# Patient Record
Sex: MALE | Race: WHITE | NOT HISPANIC OR LATINO | Employment: FULL TIME | URBAN - METROPOLITAN AREA
[De-identification: names, ages, dates, MRNs, and addresses within clinical notes are randomized per-mention and may not be internally consistent; named-entity substitution may affect disease eponyms.]

---

## 2021-01-21 ENCOUNTER — HISTORICAL (OUTPATIENT)
Dept: RADIOLOGY | Facility: HOSPITAL | Age: 56
End: 2021-01-21

## 2021-05-27 ENCOUNTER — HISTORICAL (OUTPATIENT)
Dept: HEMATOLOGY/ONCOLOGY | Facility: CLINIC | Age: 56
End: 2021-05-27

## 2022-01-07 ENCOUNTER — HISTORICAL (OUTPATIENT)
Dept: HEMATOLOGY/ONCOLOGY | Facility: CLINIC | Age: 57
End: 2022-01-07

## 2022-07-12 DIAGNOSIS — C85.89 PRIMARY CENTRAL NERVOUS SYSTEM LYMPHOMA: Primary | ICD-10-CM

## 2022-07-15 NOTE — PROGRESS NOTES
Subjective:       Patient ID: Ghulam Cox is a 56 y.o. male.    Chief Complaint:  Intermittent muscle cramps    HPI  Diagnosis: Primary CNS lymphoma                    + COVID-19 vaccination x4    Treatment History  MATRix x 4 ---> Auto BMT 5/18    Current Treatment: Observation    Clinical History:  Patient initially presented 12/17 with balance issues and fell on several occasions. He subsequently developed right sided weakness which progressed fairly rapidly. He was diagnosed with primary CNS lymphoma by biopsy 1/18. 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 No MYC rearrangement. He was treated at [facility] ([facility]) with 4 cycles of MATRix (HD Mtx, HD Mandy-C, Rituxan, Thiotepa) with a favorable response and improvement in his neurologic deficits. He underwent an autologous stem cell transplant with BCNU-Thiotepa conditioning and [place] due to insurance issues 5/18 with a complete response. He was followed on surveillance at [facility] with no evidence of disease recurrence. MRI of the brain 1/23/20 showed periventricular treatment-related encephalomalacia in the left parietal lobe with no evidence of residual or recurrent disease. His last surveillance appointment at [facility] was 7/29/20. He had no clinical findings suspicious for disease recurrence. A follow-up MRI of the brain was recommended in 6 months.    He was seen as a new patient at [facility] 1/12/21 to establish local Oncology care. He relocated here in November, 2020 due to his job. He denied any headaches, vision changes or focal neurologic symptoms. He reported chronic right hip pain which was long-standing. He underwent an MRI of the brain 1/21/21 with comparison to a previous outside MRI 1/22/20 from [facility]. There was mild T2/FLAIR hyperintense signal in the left frontal and periventricular white matter which was stable and unchanged from his previous exam.  There were no additional abnormal findings or evidence of disease recurrence.      Interval History  He returns to the office today by himself for a 6 month surveillance visit.  He is now 4 years out from his consolidative stem cell transplant.  He has chronic paresthesias on the right side, leg more than the arm.  He is taking gabapentin 600 mg at bedtime and 300 mg occasionally during the day for symptom control.  He also has intermittent muscle cramping.  He is unable to take multivitamins because it worsens his symptoms.  Surveillance MRI of the brain 1/7/22 showed stable post treatment changes in the left frontal lobe with hemosiderin staining.  There was no abnormal enhancement, edema or mass effect.  Laboratory testing shows a stable erythrocytosis due to testosterone replacement.    Review of Systems   Constitutional: Negative for appetite change, fatigue and fever.   HENT: Negative for mouth sores, sore throat and trouble swallowing.    Eyes: Negative.    Respiratory: Negative for cough, chest tightness and shortness of breath.    Cardiovascular: Negative for chest pain, palpitations and leg swelling.   Gastrointestinal: Negative for abdominal distention, abdominal pain, blood in stool, change in bowel habit, constipation, diarrhea, nausea, vomiting and change in bowel habit.   Genitourinary: Negative for dysuria, frequency and urgency.   Musculoskeletal: Negative for arthralgias and back pain.        Intermittent muscle cramping   Integumentary:  Negative for rash and mole/lesion.   Neurological: Positive for numbness (Intermittent numbness/tingling right leg).   Hematological: Negative for adenopathy. Does not bruise/bleed easily.       PMHx:  Sciatica, gout, kidney stones, secondary polycythemia (testosterone), RLE DVT  PSHx:  Brain biopsy, right fourth finger surgery (trauma), left hip replacement  SH:  Former smoker <1 PPD, quit 1991. Rare alcohol use in the past, none currently. , lives in  [place]  [employer] .  FH:  No family history of cancer.       Objective:        /83 (BP Location: Right arm, Patient Position: Sitting, BP Method: Large (Automatic))   Pulse 84   Temp 96 °F (35.6 °C)   Resp 18   Ht 5' 7" (1.702 m)   Wt 105.2 kg (231 lb 14.8 oz)   SpO2 99%   BMI 36.32 kg/m²    Physical Exam  Constitutional:       Appearance: Normal appearance.   HENT:      Head: Normocephalic.      Comments: Well-healed left parietal biopsy site     Nose: Nose normal.      Mouth/Throat:      Mouth: Mucous membranes are moist.      Pharynx: Oropharynx is clear. No posterior oropharyngeal erythema.   Eyes:      Extraocular Movements: Extraocular movements intact.      Conjunctiva/sclera: Conjunctivae normal.      Pupils: Pupils are equal, round, and reactive to light.   Cardiovascular:      Rate and Rhythm: Normal rate and regular rhythm.      Heart sounds: No murmur heard.  Pulmonary:      Breath sounds: Normal breath sounds.   Abdominal:      General: Abdomen is flat. Bowel sounds are normal. There is no distension.      Palpations: Abdomen is soft. There is no mass.      Tenderness: There is no abdominal tenderness.   Musculoskeletal:         General: No swelling or tenderness. Normal range of motion.      Cervical back: Normal range of motion and neck supple. No tenderness.   Lymphadenopathy:      Comments: No palpable cervical, supraclavicular, axillary or inguinal adenopathy.   Skin:     General: Skin is warm and dry.      Findings: No lesion or rash.   Neurological:      General: No focal deficit present.      Mental Status: He is alert and oriented to person, place, and time.      Cranial Nerves: No cranial nerve deficit.      Gait: Gait normal.       ECOG SCORE    0 - Fully active-able to carry on all pre-disease performance without restriction          LABORATORY  Recent Results (from the past 168 hour(s))   CBC with Differential    Collection Time: " 07/19/22  2:44 PM   Result Value Ref Range    WBC 5.8 4.5 - 11.5 x10(3)/mcL    RBC 5.80 4.70 - 6.10 x10(6)/mcL    Hgb 18.2 (H) 14.0 - 18.0 gm/dL    Hct 54.0 (H) 42.0 - 52.0 %    MCV 93.1 80.0 - 94.0 fL    MCH 31.4 (H) 27.0 - 31.0 pg    MCHC 33.7 33.0 - 36.0 mg/dL    RDW 12.9 11.5 - 17.0 %    Platelet 163 130 - 400 x10(3)/mcL    MPV 9.7 7.4 - 10.4 fL    Neut % 71.9 %    Lymph % 18.1 %    Mono % 8.8 %    Eos % 0.5 %    Basophil % 0.5 %    Lymph # 1.05 0.6 - 4.6 x10(3)/mcL    Neut # 4.2 2.1 - 9.2 x10(3)/mcL    Mono # 0.51 0.1 - 1.3 x10(3)/mcL    Eos # 0.03 0 - 0.9 x10(3)/mcL    Baso # 0.03 0 - 0.2 x10(3)/mcL    IG# 0.01 0 - 0.04 x10(3)/mcL    IG% 0.2 %            Assessment:   Primary CNS lymphoma - in clinical remission  Chronic neuropathic symptoms involving right leg - stable  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  He has chronic paresthesias and intermittent muscle cramps from his previous treatment.  He has a stable erythrocytosis from his testosterone replacement therapy.  He is being referred for a cardiology evaluation by his primary care doctor which would be prudent given his secondary erythrocytosis.  He will be scheduled for a follow-up MRI of the brain in 6 months.  RTC after the MRI for a follow-up visit and clinical exam.      BRENNEN ZHOU MD    Other Physicians  Dr. Brandon Mclain ([facility])

## 2022-07-19 ENCOUNTER — LAB VISIT (OUTPATIENT)
Dept: LAB | Facility: HOSPITAL | Age: 57
End: 2022-07-19
Attending: INTERNAL MEDICINE
Payer: COMMERCIAL

## 2022-07-19 ENCOUNTER — OFFICE VISIT (OUTPATIENT)
Dept: HEMATOLOGY/ONCOLOGY | Facility: CLINIC | Age: 57
End: 2022-07-19
Payer: COMMERCIAL

## 2022-07-19 VITALS
WEIGHT: 231.94 LBS | TEMPERATURE: 96 F | SYSTOLIC BLOOD PRESSURE: 132 MMHG | OXYGEN SATURATION: 99 % | BODY MASS INDEX: 36.4 KG/M2 | DIASTOLIC BLOOD PRESSURE: 83 MMHG | HEIGHT: 67 IN | RESPIRATION RATE: 18 BRPM | HEART RATE: 84 BPM

## 2022-07-19 DIAGNOSIS — C80.1 NEUROPATHY ASSOCIATED WITH CANCER: ICD-10-CM

## 2022-07-19 DIAGNOSIS — G63 NEUROPATHY ASSOCIATED WITH CANCER: ICD-10-CM

## 2022-07-19 DIAGNOSIS — C85.89 PRIMARY CENTRAL NERVOUS SYSTEM LYMPHOMA: ICD-10-CM

## 2022-07-19 DIAGNOSIS — C85.89 PRIMARY CENTRAL NERVOUS SYSTEM LYMPHOMA: Primary | ICD-10-CM

## 2022-07-19 LAB
BASOPHILS # BLD AUTO: 0.03 X10(3)/MCL (ref 0–0.2)
BASOPHILS NFR BLD AUTO: 0.5 %
EOSINOPHIL # BLD AUTO: 0.03 X10(3)/MCL (ref 0–0.9)
EOSINOPHIL NFR BLD AUTO: 0.5 %
ERYTHROCYTE [DISTWIDTH] IN BLOOD BY AUTOMATED COUNT: 12.9 % (ref 11.5–17)
HCT VFR BLD AUTO: 54 % (ref 42–52)
HGB BLD-MCNC: 18.2 GM/DL (ref 14–18)
IMM GRANULOCYTES # BLD AUTO: 0.01 X10(3)/MCL (ref 0–0.04)
IMM GRANULOCYTES NFR BLD AUTO: 0.2 %
LYMPHOCYTES # BLD AUTO: 1.05 X10(3)/MCL (ref 0.6–4.6)
LYMPHOCYTES NFR BLD AUTO: 18.1 %
MCH RBC QN AUTO: 31.4 PG (ref 27–31)
MCHC RBC AUTO-ENTMCNC: 33.7 MG/DL (ref 33–36)
MCV RBC AUTO: 93.1 FL (ref 80–94)
MONOCYTES # BLD AUTO: 0.51 X10(3)/MCL (ref 0.1–1.3)
MONOCYTES NFR BLD AUTO: 8.8 %
NEUTROPHILS # BLD AUTO: 4.2 X10(3)/MCL (ref 2.1–9.2)
NEUTROPHILS NFR BLD AUTO: 71.9 %
PLATELET # BLD AUTO: 163 X10(3)/MCL (ref 130–400)
PMV BLD AUTO: 9.7 FL (ref 7.4–10.4)
RBC # BLD AUTO: 5.8 X10(6)/MCL (ref 4.7–6.1)
WBC # SPEC AUTO: 5.8 X10(3)/MCL (ref 4.5–11.5)

## 2022-07-19 PROCEDURE — 3008F PR BODY MASS INDEX (BMI) DOCUMENTED: ICD-10-PCS | Mod: CPTII,S$GLB,, | Performed by: INTERNAL MEDICINE

## 2022-07-19 PROCEDURE — 3075F PR MOST RECENT SYSTOLIC BLOOD PRESS GE 130-139MM HG: ICD-10-PCS | Mod: CPTII,S$GLB,, | Performed by: INTERNAL MEDICINE

## 2022-07-19 PROCEDURE — 1160F PR REVIEW ALL MEDS BY PRESCRIBER/CLIN PHARMACIST DOCUMENTED: ICD-10-PCS | Mod: CPTII,S$GLB,, | Performed by: INTERNAL MEDICINE

## 2022-07-19 PROCEDURE — 99214 PR OFFICE/OUTPT VISIT, EST, LEVL IV, 30-39 MIN: ICD-10-PCS | Mod: S$GLB,,, | Performed by: INTERNAL MEDICINE

## 2022-07-19 PROCEDURE — 3075F SYST BP GE 130 - 139MM HG: CPT | Mod: CPTII,S$GLB,, | Performed by: INTERNAL MEDICINE

## 2022-07-19 PROCEDURE — 3079F DIAST BP 80-89 MM HG: CPT | Mod: CPTII,S$GLB,, | Performed by: INTERNAL MEDICINE

## 2022-07-19 PROCEDURE — 3008F BODY MASS INDEX DOCD: CPT | Mod: CPTII,S$GLB,, | Performed by: INTERNAL MEDICINE

## 2022-07-19 PROCEDURE — 36415 COLL VENOUS BLD VENIPUNCTURE: CPT

## 2022-07-19 PROCEDURE — 1159F MED LIST DOCD IN RCRD: CPT | Mod: CPTII,S$GLB,, | Performed by: INTERNAL MEDICINE

## 2022-07-19 PROCEDURE — 1159F PR MEDICATION LIST DOCUMENTED IN MEDICAL RECORD: ICD-10-PCS | Mod: CPTII,S$GLB,, | Performed by: INTERNAL MEDICINE

## 2022-07-19 PROCEDURE — 85025 COMPLETE CBC W/AUTO DIFF WBC: CPT

## 2022-07-19 PROCEDURE — 1160F RVW MEDS BY RX/DR IN RCRD: CPT | Mod: CPTII,S$GLB,, | Performed by: INTERNAL MEDICINE

## 2022-07-19 PROCEDURE — 99214 OFFICE O/P EST MOD 30 MIN: CPT | Mod: S$GLB,,, | Performed by: INTERNAL MEDICINE

## 2022-07-19 PROCEDURE — 99999 PR PBB SHADOW E&M-EST. PATIENT-LVL IV: ICD-10-PCS | Mod: PBBFAC,,, | Performed by: INTERNAL MEDICINE

## 2022-07-19 PROCEDURE — 99999 PR PBB SHADOW E&M-EST. PATIENT-LVL IV: CPT | Mod: PBBFAC,,, | Performed by: INTERNAL MEDICINE

## 2022-07-19 PROCEDURE — 3079F PR MOST RECENT DIASTOLIC BLOOD PRESSURE 80-89 MM HG: ICD-10-PCS | Mod: CPTII,S$GLB,, | Performed by: INTERNAL MEDICINE

## 2022-07-19 RX ORDER — SILDENAFIL 100 MG/1
TABLET, FILM COATED ORAL DAILY PRN
COMMUNITY
Start: 2022-04-06

## 2022-07-19 RX ORDER — CYCLOBENZAPRINE HCL 5 MG
5 TABLET ORAL 2 TIMES DAILY
COMMUNITY
Start: 2022-06-30

## 2022-07-19 RX ORDER — ALLOPURINOL 100 MG/1
100 TABLET ORAL DAILY
COMMUNITY
End: 2024-01-19

## 2022-07-19 RX ORDER — NEOMYCIN SULFATE, POLYMYXIN B SULFATE, HYDROCORTISONE 3.5; 10000; 1 MG/ML; [USP'U]/ML; MG/ML
2 SOLUTION/ DROPS AURICULAR (OTIC)
COMMUNITY
Start: 2021-06-01 | End: 2022-07-19

## 2022-07-19 RX ORDER — GABAPENTIN 300 MG/1
600 CAPSULE ORAL NIGHTLY
Qty: 90 CAPSULE | Refills: 6 | Status: SHIPPED | OUTPATIENT
Start: 2022-07-19 | End: 2023-01-18 | Stop reason: SDUPTHER

## 2022-07-19 RX ORDER — TRAZODONE HYDROCHLORIDE 50 MG/1
100 TABLET ORAL NIGHTLY PRN
COMMUNITY
Start: 2022-05-26

## 2022-07-19 RX ORDER — GABAPENTIN 300 MG/1
600 CAPSULE ORAL NIGHTLY
COMMUNITY
Start: 2022-07-09 | End: 2022-07-19 | Stop reason: SDUPTHER

## 2022-12-14 ENCOUNTER — APPOINTMENT (OUTPATIENT)
Dept: RADIOLOGY | Facility: HOSPITAL | Age: 57
End: 2022-12-14
Attending: INTERNAL MEDICINE
Payer: COMMERCIAL

## 2022-12-14 DIAGNOSIS — C85.89 PRIMARY CENTRAL NERVOUS SYSTEM LYMPHOMA: ICD-10-CM

## 2022-12-14 DIAGNOSIS — C80.1 NEUROPATHY ASSOCIATED WITH CANCER: ICD-10-CM

## 2022-12-14 DIAGNOSIS — G63 NEUROPATHY ASSOCIATED WITH CANCER: ICD-10-CM

## 2022-12-14 PROCEDURE — 70553 MRI BRAIN STEM W/O & W/DYE: CPT | Mod: TC

## 2022-12-14 PROCEDURE — 25500020 PHARM REV CODE 255: Performed by: INTERNAL MEDICINE

## 2022-12-14 PROCEDURE — A9577 INJ MULTIHANCE: HCPCS | Performed by: INTERNAL MEDICINE

## 2022-12-14 RX ADMIN — GADOBENATE DIMEGLUMINE 20 ML: 529 INJECTION, SOLUTION INTRAVENOUS at 11:12

## 2022-12-23 NOTE — PROGRESS NOTES
Subjective:       Patient ID: Ghulam Cox is a 57 y.o. male.    Chief Complaint:  I feel about the same    Diagnosis: Primary CNS lymphoma                    + COVID-19 vaccinated    Treatment History  MATRix x 4 ---> Auto BMT 5/18    Current Treatment: Observation    Clinical History:  Patient initially presented 12/17 with balance issues and fell on several occasions. He subsequently developed right sided weakness which progressed fairly rapidly. He was diagnosed with primary CNS lymphoma by biopsy 1/18. 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 No MYC rearrangement. He was treated at [facility] ([facility]) with 4 cycles of MATRix (HD Mtx, HD Mandy-C, Rituxan, Thiotepa) with a favorable response and improvement in his neurologic deficits. He underwent an autologous stem cell transplant with BCNU-Thiotepa conditioning and [place] due to insurance issues 5/18 with a complete response. He was followed on surveillance at [facility] with no evidence of disease recurrence. MRI of the brain 1/23/20 showed periventricular treatment-related encephalomalacia in the left parietal lobe with no evidence of residual or recurrent disease. His last surveillance appointment at [facility] was 7/29/20. He had no clinical findings suspicious for disease recurrence. A follow-up MRI of the brain was recommended in 6 months.    He was seen as a new patient at [facility] 1/12/21 to establish local Oncology care. He relocated here in November, 2020 due to his job. He denied any headaches, vision changes or focal neurologic symptoms. He reported chronic right hip pain which was long-standing. He underwent an MRI of the brain 1/21/21 with comparison to a previous outside MRI 1/22/20 from [facility]. There was mild T2/FLAIR hyperintense signal in the left frontal and periventricular white matter which was stable and unchanged from his previous exam. There were no  additional abnormal findings or evidence of disease recurrence.  MRI of the brain 1/7/22 showed stable post treatment changes in the left frontal lobe with hemosiderin staining.     Interval History  He returns to clinic today by himself for a six-month follow-up visit.  He is now 4 and half years out from his consolidative stem cell transplant.  He has stable chronic paresthesias on the right, leg greater than the arm.  He is ambulatory without assistance.  He denies any falls.  He is still taking gabapentin which helps manage his symptoms.  He is also using medical marijuana which has helped with his muscle cramps and twitching.  He denies any headaches.  He complains of a poor short-term memory.  MRI of the brain 12/14/22 showed stable post treatment changes in the left frontal lobe with no findings suspicious for disease recurrence.  Laboratory testing shows mild erythrocytosis secondary to testosterone replacement.      Review of Systems   Constitutional:  Negative for appetite change, fatigue, fever and unexpected weight change.   HENT:  Negative for mouth sores, sore throat and trouble swallowing.    Eyes: Negative.    Respiratory:  Negative for cough and shortness of breath.    Cardiovascular:  Negative for chest pain, palpitations and leg swelling.   Gastrointestinal:  Negative for abdominal distention, abdominal pain, blood in stool, constipation, diarrhea and nausea.   Genitourinary:  Negative for dysuria, frequency and urgency.   Musculoskeletal:  Negative for arthralgias and back pain.        Intermittent muscle cramping - improved   Integumentary:  Negative for rash and mole/lesion.   Neurological:  Positive for numbness (Intermittent numbness/tingling right leg). Negative for dizziness, weakness and headaches.   Hematological:  Negative for adenopathy. Does not bruise/bleed easily.   Psychiatric/Behavioral: Negative.       PMHx:  Sciatica, gout, kidney stones, secondary polycythemia (testosterone),  "RLE DVT  PSHx:  Brain biopsy, right fourth finger surgery (trauma), left hip replacement  SH:  Former smoker <1 PPD, quit 1991. Rare alcohol use in the past, none currently. , lives in [place].  of [employer] .  FH:  No family history of cancer.       Objective:        /78 (BP Location: Right arm)   Pulse 73   Temp 99.7 °F (37.6 °C) (Oral)   Resp 18   Ht 5' 7" (1.702 m)   Wt 100.7 kg (222 lb 0.1 oz)   BMI 34.77 kg/m²    Physical Exam  Constitutional:       Comments: Well-developed white male in NAD   HENT:      Head: Normocephalic.      Comments: Well-healed left parietal biopsy site     Mouth/Throat:      Mouth: Mucous membranes are moist.      Pharynx: Oropharynx is clear. No posterior oropharyngeal erythema.   Eyes:      Extraocular Movements: Extraocular movements intact.      Conjunctiva/sclera: Conjunctivae normal.      Pupils: Pupils are equal, round, and reactive to light.   Cardiovascular:      Rate and Rhythm: Normal rate and regular rhythm.      Heart sounds: No murmur heard.  Pulmonary:      Comments: Lungs clear to auscultation  Abdominal:      General: Abdomen is flat. Bowel sounds are normal. There is no distension.      Palpations: Abdomen is soft. There is no mass.      Tenderness: There is no abdominal tenderness.   Musculoskeletal:         General: No swelling or tenderness. Normal range of motion.      Cervical back: Normal range of motion and neck supple. No tenderness.   Lymphadenopathy:      Comments: No palpable cervical, supraclavicular, axillary or inguinal adenopathy.   Skin:     General: Skin is warm and dry.      Findings: No rash.   Neurological:      General: No focal deficit present.      Mental Status: He is alert and oriented to person, place, and time.      Cranial Nerves: No cranial nerve deficit.      Gait: Gait normal.     ECOG SCORE    1 - Restricted in strenuous activity-ambulatory and able to carry out work of a light " nature          LABORATORY  Recent Results (from the past 168 hour(s))   CBC with Differential    Collection Time: 12/27/22  2:32 PM   Result Value Ref Range    WBC 5.6 4.5 - 11.5 x10(3)/mcL    RBC 5.24 4.70 - 6.10 x10(6)/mcL    Hgb 16.5 14.0 - 18.0 gm/dL    Hct 49.5 42.0 - 52.0 %    MCV 94.5 (H) 80.0 - 94.0 fL    MCH 31.5 pg    MCHC 33.3 33.0 - 36.0 mg/dL    RDW 12.8 11.6 - 14.4 %    Platelet 164 140 - 371 x10(3)/mcL    MPV 9.4 9.4 - 12.4 fL    Neut % 69.9 %    Lymph % 17.6 %    Mono % 11.3 %    Eos % 0.5 %    Basophil % 0.5 %    Lymph # 0.99 0.6 - 4.6 x10(3)/mcL    Neut # 3.94 2.1 - 9.2 x10(3)/mcL    Mono # 0.64 0.1 - 1.3 x10(3)/mcL    Eos # 0.03 0 - 0.9 x10(3)/mcL    Baso # 0.03 0 - 0.2 x10(3)/mcL    IG# 0.01 0 - 0.04 x10(3)/mcL    IG% 0.2 %              Assessment:   Primary CNS lymphoma - in clinical remission  Chronic neuropathic symptoms involving right leg - stable  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  Laboratory shows a stable erythrocytosis secondary to testosterone replacement therapy.  He will return in 6 months for a follow-up visit and clinical exam with repeat laboratory.  No indications for additional surveillance imaging unless he develops new symptoms or problems.  If he continues to do well on his next visit, will change surveillance follow-up to yearly.      BRENNEN ZHOU MD    Other Physicians  Dr. Brandon Mclain ([facility])

## 2022-12-27 ENCOUNTER — OFFICE VISIT (OUTPATIENT)
Dept: HEMATOLOGY/ONCOLOGY | Facility: CLINIC | Age: 57
End: 2022-12-27
Payer: COMMERCIAL

## 2022-12-27 VITALS
DIASTOLIC BLOOD PRESSURE: 78 MMHG | SYSTOLIC BLOOD PRESSURE: 126 MMHG | HEART RATE: 73 BPM | RESPIRATION RATE: 18 BRPM | WEIGHT: 222 LBS | BODY MASS INDEX: 34.84 KG/M2 | TEMPERATURE: 100 F | HEIGHT: 67 IN

## 2022-12-27 DIAGNOSIS — C85.89 PRIMARY CENTRAL NERVOUS SYSTEM LYMPHOMA: Primary | ICD-10-CM

## 2022-12-27 PROCEDURE — 3078F PR MOST RECENT DIASTOLIC BLOOD PRESSURE < 80 MM HG: ICD-10-PCS | Mod: CPTII,S$GLB,, | Performed by: INTERNAL MEDICINE

## 2022-12-27 PROCEDURE — 3078F DIAST BP <80 MM HG: CPT | Mod: CPTII,S$GLB,, | Performed by: INTERNAL MEDICINE

## 2022-12-27 PROCEDURE — 1160F PR REVIEW ALL MEDS BY PRESCRIBER/CLIN PHARMACIST DOCUMENTED: ICD-10-PCS | Mod: CPTII,S$GLB,, | Performed by: INTERNAL MEDICINE

## 2022-12-27 PROCEDURE — 1159F MED LIST DOCD IN RCRD: CPT | Mod: CPTII,S$GLB,, | Performed by: INTERNAL MEDICINE

## 2022-12-27 PROCEDURE — 99999 PR PBB SHADOW E&M-EST. PATIENT-LVL III: ICD-10-PCS | Mod: PBBFAC,,, | Performed by: INTERNAL MEDICINE

## 2022-12-27 PROCEDURE — 99214 PR OFFICE/OUTPT VISIT, EST, LEVL IV, 30-39 MIN: ICD-10-PCS | Mod: S$GLB,,, | Performed by: INTERNAL MEDICINE

## 2022-12-27 PROCEDURE — 3008F PR BODY MASS INDEX (BMI) DOCUMENTED: ICD-10-PCS | Mod: CPTII,S$GLB,, | Performed by: INTERNAL MEDICINE

## 2022-12-27 PROCEDURE — 1160F RVW MEDS BY RX/DR IN RCRD: CPT | Mod: CPTII,S$GLB,, | Performed by: INTERNAL MEDICINE

## 2022-12-27 PROCEDURE — 1159F PR MEDICATION LIST DOCUMENTED IN MEDICAL RECORD: ICD-10-PCS | Mod: CPTII,S$GLB,, | Performed by: INTERNAL MEDICINE

## 2022-12-27 PROCEDURE — 3074F PR MOST RECENT SYSTOLIC BLOOD PRESSURE < 130 MM HG: ICD-10-PCS | Mod: CPTII,S$GLB,, | Performed by: INTERNAL MEDICINE

## 2022-12-27 PROCEDURE — 99214 OFFICE O/P EST MOD 30 MIN: CPT | Mod: S$GLB,,, | Performed by: INTERNAL MEDICINE

## 2022-12-27 PROCEDURE — 3074F SYST BP LT 130 MM HG: CPT | Mod: CPTII,S$GLB,, | Performed by: INTERNAL MEDICINE

## 2022-12-27 PROCEDURE — 99999 PR PBB SHADOW E&M-EST. PATIENT-LVL III: CPT | Mod: PBBFAC,,, | Performed by: INTERNAL MEDICINE

## 2022-12-27 PROCEDURE — 3008F BODY MASS INDEX DOCD: CPT | Mod: CPTII,S$GLB,, | Performed by: INTERNAL MEDICINE

## 2022-12-27 RX ORDER — MELOXICAM 7.5 MG/1
7.5 TABLET ORAL DAILY
COMMUNITY

## 2023-01-18 DIAGNOSIS — C85.89 PRIMARY CENTRAL NERVOUS SYSTEM LYMPHOMA: Primary | ICD-10-CM

## 2023-01-18 DIAGNOSIS — G63 NEUROPATHY ASSOCIATED WITH CANCER: ICD-10-CM

## 2023-01-18 DIAGNOSIS — C80.1 NEUROPATHY ASSOCIATED WITH CANCER: ICD-10-CM

## 2023-01-18 RX ORDER — GABAPENTIN 300 MG/1
600 CAPSULE ORAL NIGHTLY
Qty: 90 CAPSULE | Refills: 6 | Status: SHIPPED | OUTPATIENT
Start: 2023-01-18 | End: 2023-12-04 | Stop reason: SDUPTHER

## 2023-01-18 RX ORDER — NEOMYCIN SULFATE, POLYMYXIN B SULFATE AND HYDROCORTISONE 10; 3.5; 1 MG/ML; MG/ML; [USP'U]/ML
SUSPENSION/ DROPS AURICULAR (OTIC)
Qty: 10 ML | Refills: 0 | Status: SHIPPED | OUTPATIENT
Start: 2023-01-18

## 2023-07-10 ENCOUNTER — TELEPHONE (OUTPATIENT)
Dept: HEMATOLOGY/ONCOLOGY | Facility: CLINIC | Age: 58
End: 2023-07-10
Payer: COMMERCIAL

## 2023-07-20 NOTE — PROGRESS NOTES
Subjective:       Patient ID: Ghulam Cox is a 57 y.o. male.    Chief Complaint:  No new symptoms    Diagnosis: Primary CNS lymphoma                    + COVID-19 vaccinated    Treatment History  MATRix x 4 ---> Auto BMT 5/18    Current Treatment: Observation    Clinical History:  Patient initially presented 12/17 with balance issues and fell on several occasions. He subsequently developed right sided weakness which progressed fairly rapidly. He was diagnosed with primary CNS lymphoma by biopsy 1/18. 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 No MYC rearrangement. He was treated at [facility] ([facility]) with 4 cycles of MATRix (HD Mtx, HD Mandy-C, Rituxan, Thiotepa) with a favorable response and improvement in his neurologic deficits. He underwent an autologous stem cell transplant with BCNU-Thiotepa conditioning and [place] due to insurance issues 5/18 with a complete response. He was followed on surveillance at [facility] with no evidence of disease recurrence. MRI of the brain 1/23/20 showed periventricular treatment-related encephalomalacia in the left parietal lobe with no evidence of residual or recurrent disease. His last surveillance appointment at [facility] was 7/29/20. He had no clinical findings suspicious for disease recurrence. A follow-up MRI of the brain was recommended in 6 months.    He was seen as a new patient at [facility] 1/12/21 to establish local Oncology care. He relocated here in November, 2020 due to his job. He denied any headaches, vision changes or focal neurologic symptoms. He reported chronic right hip pain which was long-standing. He underwent an MRI of the brain 1/21/21 with comparison to a previous outside MRI 1/22/20 from [facility]. There was mild T2/FLAIR hyperintense signal in the left frontal and periventricular white matter which was stable and unchanged from his previous exam. There were no  additional abnormal findings or evidence of disease recurrence.  MRI of the brain 1/7/22 showed stable post treatment changes in the left frontal lobe with hemosiderin staining.     Interval History  He returns to the office today by himself for a six-month surveillance visit.  He is now over 5 years out from his consolidative stem cell transplant.  He has stable chronic paresthesias involving the right leg and to a lesser extent, the right arm.  He developed intermittent muscle cramps and twitching.  He uses medical marijuana which helps his symptoms.  He has poor short-term memory which is stable.  MRI of the brain 12/14/22 showed stable post treatment changes in the left frontal lobe with no findings suspicious for disease recurrence.  Laboratory testing shows stable mild erythrocytosis secondary to testosterone replacement.  He has not had any recent illnesses.      Review of Systems   Constitutional:  Negative for appetite change, fatigue, fever and unexpected weight change.   HENT:  Negative for mouth sores, sore throat and trouble swallowing.    Eyes: Negative.    Respiratory:  Negative for cough and shortness of breath.    Cardiovascular:  Negative for chest pain, palpitations and leg swelling.   Gastrointestinal:  Negative for abdominal distention, abdominal pain, blood in stool, constipation, diarrhea and nausea.   Genitourinary:  Negative for dysuria, frequency and urgency.   Musculoskeletal:  Negative for arthralgias and back pain.        Intermittent muscle cramping - improved   Integumentary:  Negative for rash and mole/lesion.   Neurological:  Positive for numbness (Intermittent numbness/tingling right leg). Negative for dizziness, weakness and headaches.   Hematological:  Negative for adenopathy. Does not bruise/bleed easily.   Psychiatric/Behavioral: Negative.         PMHx:  Sciatica, gout, kidney stones, secondary polycythemia (testosterone), RLE DVT  PSHx:  Brain biopsy, right fourth finger  "surgery (trauma), left hip replacement  SH:  Former smoker <1 PPD, quit 1991. Rare alcohol use in the past, none currently. , lives in [place].  of [employer] .  FH:  No family history of cancer.     Objective:        /75   Pulse 81   Temp 98 °F (36.7 °C)   Resp 16   Ht 5' 7" (1.702 m)   Wt 102.8 kg (226 lb 9.6 oz)   SpO2 98%   BMI 35.49 kg/m²    Physical Exam  Constitutional:       Comments: Well-developed white male in NAD   HENT:      Head: Normocephalic.      Comments: Well-healed left parietal biopsy site     Mouth/Throat:      Mouth: Mucous membranes are moist.      Pharynx: Oropharynx is clear. No posterior oropharyngeal erythema.   Eyes:      Extraocular Movements: Extraocular movements intact.      Conjunctiva/sclera: Conjunctivae normal.      Pupils: Pupils are equal, round, and reactive to light.   Cardiovascular:      Rate and Rhythm: Normal rate and regular rhythm.      Heart sounds: No murmur heard.  Pulmonary:      Comments: Lungs clear to auscultation  Abdominal:      General: Abdomen is flat. Bowel sounds are normal. There is no distension.      Palpations: Abdomen is soft. There is no mass.      Tenderness: There is no abdominal tenderness.   Musculoskeletal:         General: No swelling or tenderness. Normal range of motion.      Cervical back: Normal range of motion and neck supple. No tenderness.   Lymphadenopathy:      Comments: No palpable cervical, supraclavicular, axillary or inguinal adenopathy.   Skin:     General: Skin is warm and dry.      Findings: No rash.   Neurological:      General: No focal deficit present.      Mental Status: He is alert and oriented to person, place, and time.      Cranial Nerves: No cranial nerve deficit.      Gait: Gait normal.     ECOG SCORE    1 - Restricted in strenuous activity-ambulatory and able to carry out work of a light nature          LABORATORY  Recent Results (from the past 168 hour(s))   CBC " with Differential    Collection Time: 07/24/23  9:50 AM   Result Value Ref Range    WBC 6.57 4.50 - 11.50 x10(3)/mcL    RBC 5.18 4.70 - 6.10 x10(6)/mcL    Hgb 16.4 14.0 - 18.0 g/dL    Hct 49.9 42.0 - 52.0 %    MCV 96.3 (H) 80.0 - 94.0 fL    MCH 31.7 (H) 27.0 - 31.0 pg    MCHC 32.9 (L) 33.0 - 36.0 g/dL    RDW 12.9 11.5 - 17.0 %    Platelet 178 130 - 400 x10(3)/mcL    MPV 9.5 7.4 - 10.4 fL    Neut % 61.9 %    Lymph % 26.2 %    Mono % 9.6 %    Eos % 1.5 %    Basophil % 0.5 %    Lymph # 1.72 0.6 - 4.6 x10(3)/mcL    Neut # 4.07 2.1 - 9.2 x10(3)/mcL    Mono # 0.63 0.1 - 1.3 x10(3)/mcL    Eos # 0.10 0 - 0.9 x10(3)/mcL    Baso # 0.03 <=0.2 x10(3)/mcL    IG# 0.02 0 - 0.04 x10(3)/mcL    IG% 0.3 %         Assessment:   Primary CNS lymphoma - in clinical remission  Chronic neuropathic symptoms involving right leg - stable  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  He will be scheduled for an MRI of the brain in December.  If MRI remains stable at that time, will defer additional imaging unless he develops new symptoms or problems and change surveillance visits to once a year.  Treatment plan reviewed and discussed with the patient.  All questions answered.      BRENNEN ZHOU MD    Other Physicians  Dr. Brandon Mclain ([facility])

## 2023-07-24 ENCOUNTER — OFFICE VISIT (OUTPATIENT)
Dept: HEMATOLOGY/ONCOLOGY | Facility: CLINIC | Age: 58
End: 2023-07-24
Payer: COMMERCIAL

## 2023-07-24 VITALS
HEIGHT: 67 IN | TEMPERATURE: 98 F | HEART RATE: 81 BPM | SYSTOLIC BLOOD PRESSURE: 115 MMHG | OXYGEN SATURATION: 98 % | WEIGHT: 226.63 LBS | BODY MASS INDEX: 35.57 KG/M2 | DIASTOLIC BLOOD PRESSURE: 75 MMHG | RESPIRATION RATE: 16 BRPM

## 2023-07-24 DIAGNOSIS — C85.89 PRIMARY CENTRAL NERVOUS SYSTEM LYMPHOMA: Primary | ICD-10-CM

## 2023-07-24 PROCEDURE — 3078F DIAST BP <80 MM HG: CPT | Mod: CPTII,S$GLB,, | Performed by: INTERNAL MEDICINE

## 2023-07-24 PROCEDURE — 3008F PR BODY MASS INDEX (BMI) DOCUMENTED: ICD-10-PCS | Mod: CPTII,S$GLB,, | Performed by: INTERNAL MEDICINE

## 2023-07-24 PROCEDURE — 1160F RVW MEDS BY RX/DR IN RCRD: CPT | Mod: CPTII,S$GLB,, | Performed by: INTERNAL MEDICINE

## 2023-07-24 PROCEDURE — 99214 PR OFFICE/OUTPT VISIT, EST, LEVL IV, 30-39 MIN: ICD-10-PCS | Mod: S$GLB,,, | Performed by: INTERNAL MEDICINE

## 2023-07-24 PROCEDURE — 3074F SYST BP LT 130 MM HG: CPT | Mod: CPTII,S$GLB,, | Performed by: INTERNAL MEDICINE

## 2023-07-24 PROCEDURE — 3074F PR MOST RECENT SYSTOLIC BLOOD PRESSURE < 130 MM HG: ICD-10-PCS | Mod: CPTII,S$GLB,, | Performed by: INTERNAL MEDICINE

## 2023-07-24 PROCEDURE — 1159F MED LIST DOCD IN RCRD: CPT | Mod: CPTII,S$GLB,, | Performed by: INTERNAL MEDICINE

## 2023-07-24 PROCEDURE — 99214 OFFICE O/P EST MOD 30 MIN: CPT | Mod: S$GLB,,, | Performed by: INTERNAL MEDICINE

## 2023-07-24 PROCEDURE — 3078F PR MOST RECENT DIASTOLIC BLOOD PRESSURE < 80 MM HG: ICD-10-PCS | Mod: CPTII,S$GLB,, | Performed by: INTERNAL MEDICINE

## 2023-07-24 PROCEDURE — 1160F PR REVIEW ALL MEDS BY PRESCRIBER/CLIN PHARMACIST DOCUMENTED: ICD-10-PCS | Mod: CPTII,S$GLB,, | Performed by: INTERNAL MEDICINE

## 2023-07-24 PROCEDURE — 99999 PR PBB SHADOW E&M-EST. PATIENT-LVL IV: CPT | Mod: PBBFAC,,, | Performed by: INTERNAL MEDICINE

## 2023-07-24 PROCEDURE — 1159F PR MEDICATION LIST DOCUMENTED IN MEDICAL RECORD: ICD-10-PCS | Mod: CPTII,S$GLB,, | Performed by: INTERNAL MEDICINE

## 2023-07-24 PROCEDURE — 99999 PR PBB SHADOW E&M-EST. PATIENT-LVL IV: ICD-10-PCS | Mod: PBBFAC,,, | Performed by: INTERNAL MEDICINE

## 2023-07-24 PROCEDURE — 3008F BODY MASS INDEX DOCD: CPT | Mod: CPTII,S$GLB,, | Performed by: INTERNAL MEDICINE

## 2023-09-07 ENCOUNTER — PATIENT MESSAGE (OUTPATIENT)
Dept: RESEARCH | Facility: HOSPITAL | Age: 58
End: 2023-09-07
Payer: COMMERCIAL

## 2023-12-04 DIAGNOSIS — C80.1 NEUROPATHY ASSOCIATED WITH CANCER: ICD-10-CM

## 2023-12-04 DIAGNOSIS — G63 NEUROPATHY ASSOCIATED WITH CANCER: ICD-10-CM

## 2023-12-04 RX ORDER — GABAPENTIN 300 MG/1
600 CAPSULE ORAL NIGHTLY
Qty: 90 CAPSULE | Refills: 3 | Status: SHIPPED | OUTPATIENT
Start: 2023-12-04

## 2023-12-19 NOTE — PROGRESS NOTES
Subjective:       Patient ID: Ghulam Cox is a 58 y.o. male.    Chief Complaint:  Neuropathic pain right leg    Diagnosis: Primary CNS lymphoma    Treatment History  MATRix x 4 ---> Auto BMT 5/18    Current Treatment: Observation    Clinical History:  Patient initially presented 12/17 with balance issues and fell on several occasions. He subsequently developed right sided weakness which progressed fairly rapidly. He was diagnosed with primary CNS lymphoma by biopsy 1/18. 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 No MYC rearrangement. He was treated at [facility] ([facility]) with 4 cycles of MATRix (HD Mtx, HD Mandy-C, Rituxan, Thiotepa) with a favorable response and improvement in his neurologic deficits. He underwent an autologous stem cell transplant with BCNU-Thiotepa conditioning and [place] due to insurance issues 5/18 with a complete response. He was followed on surveillance at [facility] with no evidence of disease recurrence. MRI of the brain 1/23/20 showed periventricular treatment-related encephalomalacia in the left parietal lobe with no evidence of residual or recurrent disease. His last surveillance appointment at [facility] was 7/29/20. He had no clinical findings suspicious for disease recurrence. A follow-up MRI of the brain was recommended in 6 months.    He was seen as a new patient at [facility] 1/12/21 to establish local Oncology care. He relocated here in November, 2020 due to his job. He denied any headaches, vision changes or focal neurologic symptoms. He reported chronic right hip pain which was long-standing.     MRI brain 1/21/21:   Mild T2/FLAIR hyperintense signal left frontal periventricular white matter stable and unchanged from 1/22/20 ([facility]).  No evidence of disease recurrence.  MRI brain 1/07/22:  Stable post treatment changes left frontal lobe with hemosiderin staining.  MRI brain 12/20/23:  Stable  small focus of encephalomalacia left posterior frontal lobe.  No evidence of disease recurrence.       Interval History  He returns to clinic today by himself for a five-month follow-up visit.  He is now 5 and half years out from his autologous stem cell transplant.  He reports no recent illnesses.  He has chronic right hip pain with paresthesias involving the right leg.  He is ambulatory without assistance.  He denies any falls.  He continues to take gabapentin for his neuropathic symptoms.  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  Follow-up CBC showed improvement.  MRI of the brain 12/20/23 showed stable post treatment changes with no evidence of disease recurrence.      Review of Systems   Constitutional:  Negative for appetite change, fatigue, fever and unexpected weight change.   HENT:  Negative for mouth sores, sore throat and trouble swallowing.    Eyes: Negative.    Respiratory:  Negative for cough and shortness of breath.    Cardiovascular:  Negative for chest pain, palpitations and leg swelling.   Gastrointestinal:  Negative for abdominal distention, abdominal pain, constipation, diarrhea and nausea.   Genitourinary:  Negative for dysuria, frequency and urgency.   Musculoskeletal:  Negative for arthralgias and back pain.   Integumentary:  Negative for pallor and rash.   Neurological:  Positive for numbness (Intermittent numbness/tingling right leg). Negative for dizziness, weakness and headaches.   Hematological:  Negative for adenopathy. Does not bruise/bleed easily.   Psychiatric/Behavioral: Negative.         PMHx:  Sciatica, gout, kidney stones, secondary polycythemia (testosterone), RLE DVT  PSHx:  Brain biopsy, right fourth finger surgery (trauma), left hip replacement  SH:  Former smoker <1 PPD, quit 1991. Rare alcohol use in the past, none currently. , lives in [place].  of  [employer]  FH:  No family history of cancer.     Objective:        /75   Pulse 80   Temp 98.2 °F (36.8 °C)   Resp 15   Ht 5' 7" (1.702 m)   Wt 103.1 kg (227 lb 6.4 oz)   SpO2 97%   BMI 35.62 kg/m²    Physical Exam  Constitutional:       Comments: Well-developed white male in NAD   HENT:      Head: Normocephalic.      Comments: Well-healed left parietal biopsy site     Mouth/Throat:      Mouth: Mucous membranes are moist.      Pharynx: Oropharynx is clear. No posterior oropharyngeal erythema.   Eyes:      Extraocular Movements: Extraocular movements intact.      Conjunctiva/sclera: Conjunctivae normal.      Pupils: Pupils are equal, round, and reactive to light.   Cardiovascular:      Rate and Rhythm: Normal rate and regular rhythm.      Heart sounds: No murmur heard.  Pulmonary:      Comments: Lungs clear to auscultation  Abdominal:      General: Bowel sounds are normal. There is no distension.      Palpations: Abdomen is soft. There is no mass.      Tenderness: There is no abdominal tenderness.   Musculoskeletal:         General: No swelling or tenderness. Normal range of motion.      Cervical back: Neck supple. No tenderness.   Lymphadenopathy:      Comments: No palpable cervical, supraclavicular, axillary or inguinal adenopathy.   Skin:     General: Skin is warm and dry.      Findings: No rash.   Neurological:      General: No focal deficit present.      Mental Status: He is alert and oriented to person, place, and time.      Cranial Nerves: No cranial nerve deficit.      Motor: No weakness.       ECOG SCORE    0 - Fully active-able to carry on all pre-disease performance without restriction          LABORATORY  Recent Results (from the past 168 hour(s))   ISTAT CREATININE    Collection Time: 12/20/23  8:21 AM   Result Value Ref Range    POC Creatinine 1.0 0.5 - 1.4 mg/dL    Sample unknown    Comprehensive Metabolic Panel    Collection Time: 12/22/23 12:24 PM   Result Value Ref Range    Sodium Level " 138 136 - 145 mmol/L    Potassium Level 4.3 3.5 - 5.1 mmol/L    Chloride 106 98 - 107 mmol/L    Carbon Dioxide 25 23 - 31 mmol/L    Glucose Level 102 82 - 115 mg/dL    Blood Urea Nitrogen 12.6 8.4 - 25.7 mg/dL    Creatinine 0.86 0.73 - 1.18 mg/dL    Calcium Level Total 9.3 8.8 - 10.0 mg/dL    Protein Total 6.2 5.8 - 7.6 gm/dL    Albumin Level 4.2 3.4 - 4.8 g/dL    Globulin 2.0 (L) 2.4 - 3.5 gm/dL    Albumin/Globulin Ratio 2.1 (H) 1.1 - 2.0 ratio    Bilirubin Total 0.6 <=1.5 mg/dL    Alkaline Phosphatase 61 40 - 150 unit/L    Alanine Aminotransferase 26 0 - 55 unit/L    Aspartate Aminotransferase 26 5 - 34 unit/L    eGFR >60 mls/min/1.73/m2   CBC with Differential    Collection Time: 12/22/23 12:24 PM   Result Value Ref Range    WBC 4.72 4.50 - 11.50 x10(3)/mcL    RBC 4.44 (L) 4.70 - 6.10 x10(6)/mcL    Hgb 14.4 14.0 - 18.0 g/dL    Hct 42.0 42.0 - 52.0 %    MCV 94.6 (H) 80.0 - 94.0 fL    MCH 32.4 (H) 27.0 - 31.0 pg    MCHC 34.3 33.0 - 36.0 g/dL    RDW 12.9 11.5 - 17.0 %    Platelet 184 130 - 400 x10(3)/mcL    MPV 9.5 7.4 - 10.4 fL    Neut % 57.7 %    Lymph % 28.8 %    Mono % 11.0 %    Eos % 1.5 %    Basophil % 0.8 %    Lymph # 1.36 0.6 - 4.6 x10(3)/mcL    Neut # 2.72 2.1 - 9.2 x10(3)/mcL    Mono # 0.52 0.1 - 1.3 x10(3)/mcL    Eos # 0.07 0 - 0.9 x10(3)/mcL    Baso # 0.04 <=0.2 x10(3)/mcL    IG# 0.01 0 - 0.04 x10(3)/mcL    IG% 0.2 %           Assessment:   Primary CNS lymphoma - in clinical remission  Chronic neuropathic symptoms involving right leg - stable  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  Continue phlebotomy on an as-needed basis to maintain hematocrit level < 50.  No role for additional surveillance imaging unless he develops new symptoms or problems.  RTC in November, 2024 for a follow-up visit and clinical exam with repeat laboratory. findings suspicious for  disease recurrence.      BRENNEN ZHOU MD    Other Physicians  Dr. Brandon Mclain ([facility])

## 2023-12-20 ENCOUNTER — APPOINTMENT (OUTPATIENT)
Dept: RADIOLOGY | Facility: HOSPITAL | Age: 58
End: 2023-12-20
Attending: INTERNAL MEDICINE
Payer: COMMERCIAL

## 2023-12-20 DIAGNOSIS — C85.89 PRIMARY CENTRAL NERVOUS SYSTEM LYMPHOMA: ICD-10-CM

## 2023-12-20 LAB
CREAT SERPL-MCNC: 1 MG/DL (ref 0.5–1.4)
SAMPLE: NORMAL

## 2023-12-20 PROCEDURE — 25500020 PHARM REV CODE 255: Performed by: INTERNAL MEDICINE

## 2023-12-20 PROCEDURE — A9577 INJ MULTIHANCE: HCPCS | Performed by: INTERNAL MEDICINE

## 2023-12-20 PROCEDURE — 70553 MRI BRAIN STEM W/O & W/DYE: CPT | Mod: TC

## 2023-12-20 RX ADMIN — GADOBENATE DIMEGLUMINE 20 ML: 529 INJECTION, SOLUTION INTRAVENOUS at 08:12

## 2023-12-22 ENCOUNTER — LAB VISIT (OUTPATIENT)
Dept: LAB | Facility: HOSPITAL | Age: 58
End: 2023-12-22
Attending: INTERNAL MEDICINE
Payer: COMMERCIAL

## 2023-12-22 DIAGNOSIS — C85.89 PRIMARY CENTRAL NERVOUS SYSTEM LYMPHOMA: ICD-10-CM

## 2023-12-22 LAB
ALBUMIN SERPL-MCNC: 4.2 G/DL (ref 3.4–4.8)
ALBUMIN/GLOB SERPL: 2.1 RATIO (ref 1.1–2)
ALP SERPL-CCNC: 61 UNIT/L (ref 40–150)
ALT SERPL-CCNC: 26 UNIT/L (ref 0–55)
AST SERPL-CCNC: 26 UNIT/L (ref 5–34)
BASOPHILS # BLD AUTO: 0.04 X10(3)/MCL
BASOPHILS NFR BLD AUTO: 0.8 %
BILIRUB SERPL-MCNC: 0.6 MG/DL
BUN SERPL-MCNC: 12.6 MG/DL (ref 8.4–25.7)
CALCIUM SERPL-MCNC: 9.3 MG/DL (ref 8.8–10)
CHLORIDE SERPL-SCNC: 106 MMOL/L (ref 98–107)
CO2 SERPL-SCNC: 25 MMOL/L (ref 23–31)
CREAT SERPL-MCNC: 0.86 MG/DL (ref 0.73–1.18)
EOSINOPHIL # BLD AUTO: 0.07 X10(3)/MCL (ref 0–0.9)
EOSINOPHIL NFR BLD AUTO: 1.5 %
ERYTHROCYTE [DISTWIDTH] IN BLOOD BY AUTOMATED COUNT: 12.9 % (ref 11.5–17)
GFR SERPLBLD CREATININE-BSD FMLA CKD-EPI: >60 MLS/MIN/1.73/M2
GLOBULIN SER-MCNC: 2 GM/DL (ref 2.4–3.5)
GLUCOSE SERPL-MCNC: 102 MG/DL (ref 82–115)
HCT VFR BLD AUTO: 42 % (ref 42–52)
HGB BLD-MCNC: 14.4 G/DL (ref 14–18)
IMM GRANULOCYTES # BLD AUTO: 0.01 X10(3)/MCL (ref 0–0.04)
IMM GRANULOCYTES NFR BLD AUTO: 0.2 %
LYMPHOCYTES # BLD AUTO: 1.36 X10(3)/MCL (ref 0.6–4.6)
LYMPHOCYTES NFR BLD AUTO: 28.8 %
MCH RBC QN AUTO: 32.4 PG (ref 27–31)
MCHC RBC AUTO-ENTMCNC: 34.3 G/DL (ref 33–36)
MCV RBC AUTO: 94.6 FL (ref 80–94)
MONOCYTES # BLD AUTO: 0.52 X10(3)/MCL (ref 0.1–1.3)
MONOCYTES NFR BLD AUTO: 11 %
NEUTROPHILS # BLD AUTO: 2.72 X10(3)/MCL (ref 2.1–9.2)
NEUTROPHILS NFR BLD AUTO: 57.7 %
PLATELET # BLD AUTO: 184 X10(3)/MCL (ref 130–400)
PMV BLD AUTO: 9.5 FL (ref 7.4–10.4)
POTASSIUM SERPL-SCNC: 4.3 MMOL/L (ref 3.5–5.1)
PROT SERPL-MCNC: 6.2 GM/DL (ref 5.8–7.6)
RBC # BLD AUTO: 4.44 X10(6)/MCL (ref 4.7–6.1)
SODIUM SERPL-SCNC: 138 MMOL/L (ref 136–145)
WBC # SPEC AUTO: 4.72 X10(3)/MCL (ref 4.5–11.5)

## 2023-12-22 PROCEDURE — 80053 COMPREHEN METABOLIC PANEL: CPT

## 2023-12-22 PROCEDURE — 85025 COMPLETE CBC W/AUTO DIFF WBC: CPT

## 2023-12-22 PROCEDURE — 36415 COLL VENOUS BLD VENIPUNCTURE: CPT

## 2023-12-26 ENCOUNTER — OFFICE VISIT (OUTPATIENT)
Dept: HEMATOLOGY/ONCOLOGY | Facility: CLINIC | Age: 58
End: 2023-12-26
Payer: COMMERCIAL

## 2023-12-26 VITALS
TEMPERATURE: 98 F | BODY MASS INDEX: 35.69 KG/M2 | OXYGEN SATURATION: 97 % | HEIGHT: 67 IN | DIASTOLIC BLOOD PRESSURE: 75 MMHG | WEIGHT: 227.38 LBS | HEART RATE: 80 BPM | RESPIRATION RATE: 15 BRPM | SYSTOLIC BLOOD PRESSURE: 110 MMHG

## 2023-12-26 DIAGNOSIS — C85.89 PRIMARY CENTRAL NERVOUS SYSTEM LYMPHOMA: Primary | ICD-10-CM

## 2023-12-26 PROCEDURE — 1160F RVW MEDS BY RX/DR IN RCRD: CPT | Mod: CPTII,S$GLB,, | Performed by: INTERNAL MEDICINE

## 2023-12-26 PROCEDURE — 1159F MED LIST DOCD IN RCRD: CPT | Mod: CPTII,S$GLB,, | Performed by: INTERNAL MEDICINE

## 2023-12-26 PROCEDURE — 3078F PR MOST RECENT DIASTOLIC BLOOD PRESSURE < 80 MM HG: ICD-10-PCS | Mod: CPTII,S$GLB,, | Performed by: INTERNAL MEDICINE

## 2023-12-26 PROCEDURE — 1159F PR MEDICATION LIST DOCUMENTED IN MEDICAL RECORD: ICD-10-PCS | Mod: CPTII,S$GLB,, | Performed by: INTERNAL MEDICINE

## 2023-12-26 PROCEDURE — 1160F PR REVIEW ALL MEDS BY PRESCRIBER/CLIN PHARMACIST DOCUMENTED: ICD-10-PCS | Mod: CPTII,S$GLB,, | Performed by: INTERNAL MEDICINE

## 2023-12-26 PROCEDURE — 3078F DIAST BP <80 MM HG: CPT | Mod: CPTII,S$GLB,, | Performed by: INTERNAL MEDICINE

## 2023-12-26 PROCEDURE — 99999 PR PBB SHADOW E&M-EST. PATIENT-LVL IV: ICD-10-PCS | Mod: PBBFAC,,, | Performed by: INTERNAL MEDICINE

## 2023-12-26 PROCEDURE — 3008F BODY MASS INDEX DOCD: CPT | Mod: CPTII,S$GLB,, | Performed by: INTERNAL MEDICINE

## 2023-12-26 PROCEDURE — 3074F PR MOST RECENT SYSTOLIC BLOOD PRESSURE < 130 MM HG: ICD-10-PCS | Mod: CPTII,S$GLB,, | Performed by: INTERNAL MEDICINE

## 2023-12-26 PROCEDURE — 3008F PR BODY MASS INDEX (BMI) DOCUMENTED: ICD-10-PCS | Mod: CPTII,S$GLB,, | Performed by: INTERNAL MEDICINE

## 2023-12-26 PROCEDURE — 99214 PR OFFICE/OUTPT VISIT, EST, LEVL IV, 30-39 MIN: ICD-10-PCS | Mod: S$GLB,,, | Performed by: INTERNAL MEDICINE

## 2023-12-26 PROCEDURE — 99214 OFFICE O/P EST MOD 30 MIN: CPT | Mod: S$GLB,,, | Performed by: INTERNAL MEDICINE

## 2023-12-26 PROCEDURE — 99999 PR PBB SHADOW E&M-EST. PATIENT-LVL IV: CPT | Mod: PBBFAC,,, | Performed by: INTERNAL MEDICINE

## 2023-12-26 PROCEDURE — 3074F SYST BP LT 130 MM HG: CPT | Mod: CPTII,S$GLB,, | Performed by: INTERNAL MEDICINE

## 2024-01-19 DIAGNOSIS — C80.1 NEUROPATHY ASSOCIATED WITH CANCER: Primary | ICD-10-CM

## 2024-01-19 DIAGNOSIS — C85.89 PRIMARY CENTRAL NERVOUS SYSTEM LYMPHOMA: ICD-10-CM

## 2024-01-19 DIAGNOSIS — G63 NEUROPATHY ASSOCIATED WITH CANCER: Primary | ICD-10-CM

## 2024-01-19 RX ORDER — ALLOPURINOL 300 MG/1
300 TABLET ORAL DAILY
COMMUNITY
Start: 2023-11-06

## 2024-01-23 RX ORDER — GABAPENTIN 300 MG/1
CAPSULE ORAL
Qty: 270 CAPSULE | Refills: 2 | Status: SHIPPED | OUTPATIENT
Start: 2024-01-23

## 2024-06-20 DIAGNOSIS — C80.1 NEUROPATHY ASSOCIATED WITH CANCER: ICD-10-CM

## 2024-06-20 DIAGNOSIS — G63 NEUROPATHY ASSOCIATED WITH CANCER: ICD-10-CM

## 2024-06-20 RX ORDER — GABAPENTIN 300 MG/1
CAPSULE ORAL
Qty: 90 CAPSULE | Refills: 1 | Status: SHIPPED | OUTPATIENT
Start: 2024-06-20

## 2024-08-09 DIAGNOSIS — C80.1 NEUROPATHY ASSOCIATED WITH CANCER: ICD-10-CM

## 2024-08-09 DIAGNOSIS — G63 NEUROPATHY ASSOCIATED WITH CANCER: ICD-10-CM

## 2024-08-09 RX ORDER — GABAPENTIN 300 MG/1
CAPSULE ORAL
Qty: 270 CAPSULE | Refills: 1 | Status: SHIPPED | OUTPATIENT
Start: 2024-08-09

## 2024-11-12 NOTE — PROGRESS NOTES
Subjective:       Patient ID: Ghulam Cox is a 59 y.o. male.    Chief Complaint:  I am doing about the same    Diagnosis: Primary CNS lymphoma    Treatment History  MATRix x 4 ---> Auto BMT 5/18    Current Treatment: Observation    Clinical History:  Patient initially presented 12/17 with balance issues and fell on several occasions. He subsequently developed right sided weakness which progressed fairly rapidly. He was diagnosed with primary CNS lymphoma by biopsy 1/18. 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 No MYC rearrangement. He was treated at [facility] ([facility]) with 4 cycles of MATRix (HD Mtx, HD Mandy-C, Rituxan, Thiotepa) with a favorable response and improvement in his neurologic deficits. He underwent an autologous stem cell transplant with BCNU-Thiotepa conditioning and [place] due to insurance issues 5/18 with a complete response. He was followed on surveillance at [facility] with no evidence of disease recurrence. MRI of the brain 1/23/20 showed periventricular treatment-related encephalomalacia in the left parietal lobe with no evidence of residual or recurrent disease. His last surveillance appointment at [facility] was 7/29/20. He had no clinical findings suspicious for disease recurrence. A follow-up MRI of the brain was recommended in 6 months.    He was seen as a new patient at [facility] 1/12/21 to establish local Oncology care. He relocated here in November, 2020 due to his job. He denied any headaches, vision changes or focal neurologic symptoms. He reported chronic right hip pain which was long-standing.     MRI brain 1/21/21:   Mild T2/FLAIR hyperintense signal left frontal periventricular white matter stable and unchanged from 1/22/20 ([facility]).  No evidence of disease recurrence.  MRI brain 1/07/22:  Stable post treatment changes left frontal lobe with hemosiderin staining.  MRI brain 12/20/23:  Stable  small focus of encephalomalacia left posterior frontal lobe.  No evidence of disease recurrence.       Interval History  He returns to the office today by himself for a 1 year follow-up.  He feels well.  He has not had any recent illnesses or infections.  No medication changes.  He does struggle at times with mild cognitive changes.  He has intermittent fasciculations involving his lower extremities.  He takes gabapentin regularly at bedtime and daily multivitamins.  He previously underwent phlebotomy for erythrocytosis secondary to testosterone replacement with a hematocrit of 50.  Recent laboratory showed a hematocrit of 43.0.  He remains on testosterone replacement.      Review of Systems   Constitutional:  Negative for appetite change, fatigue, fever and unexpected weight change.   HENT:  Negative for mouth sores, sore throat and trouble swallowing.    Eyes: Negative.    Respiratory:  Negative for cough and shortness of breath.    Cardiovascular:  Negative for chest pain, palpitations and leg swelling.   Gastrointestinal:  Negative for abdominal distention, abdominal pain, constipation, diarrhea and nausea.   Genitourinary:  Negative for dysuria, frequency and urgency.   Musculoskeletal:  Negative for arthralgias and back pain.   Integumentary:  Negative for pallor and rash.   Neurological:  Positive for numbness (Intermittent numbness/tingling right leg). Negative for dizziness, weakness and headaches.        Mild cognitive change   Hematological:  Negative for adenopathy. Does not bruise/bleed easily.   Psychiatric/Behavioral: Negative.         PMHx:  Sciatica, gout, kidney stones, secondary polycythemia (testosterone), RLE DVT  PSHx:  Brain biopsy, right fourth finger surgery (trauma), left hip replacement  SH:  Former smoker <1 PPD, quit 1991. Rare alcohol use in the past, none currently. , lives in [place].  of [employer] .  FH:  No family history of cancer.  "    Objective:        /76 (BP Location: Left arm, Patient Position: Sitting)   Pulse 97   Temp 98.5 °F (36.9 °C) (Oral)   Resp 18   Ht 5' 7" (1.702 m)   Wt 101 kg (222 lb 9.6 oz)   SpO2 96%   BMI 34.86 kg/m²    Physical Exam  Constitutional:       Comments: Well-developed white male in NAD   HENT:      Head: Normocephalic.      Comments: Well-healed left parietal biopsy site     Mouth/Throat:      Mouth: Mucous membranes are moist.      Pharynx: Oropharynx is clear. No posterior oropharyngeal erythema.   Eyes:      Extraocular Movements: Extraocular movements intact.      Conjunctiva/sclera: Conjunctivae normal.      Pupils: Pupils are equal, round, and reactive to light.   Cardiovascular:      Rate and Rhythm: Normal rate and regular rhythm.      Heart sounds: No murmur heard.  Pulmonary:      Comments: Lungs clear to auscultation  Abdominal:      General: Bowel sounds are normal. There is no distension.      Palpations: Abdomen is soft. There is no mass.      Tenderness: There is no abdominal tenderness.   Musculoskeletal:         General: No swelling or tenderness. Normal range of motion.      Cervical back: Neck supple. No tenderness.   Lymphadenopathy:      Comments: No palpable cervical, supraclavicular, axillary or inguinal adenopathy.   Skin:     General: Skin is warm and dry.      Findings: No rash.   Neurological:      General: No focal deficit present.      Mental Status: He is alert and oriented to person, place, and time.      Cranial Nerves: No cranial nerve deficit.      Motor: No weakness.       ECOG SCORE    0 - Fully active-able to carry on all pre-disease performance without restriction          LABORATORY  Recent Results (from the past week)   Comprehensive Metabolic Panel    Collection Time: 11/21/24  2:52 PM   Result Value Ref Range    Sodium 140 136 - 145 mmol/L    Potassium 3.9 3.5 - 5.1 mmol/L    Chloride 109 (H) 98 - 107 mmol/L    CO2 22 (L) 23 - 31 mmol/L    Glucose 110 82 - " 115 mg/dL    Blood Urea Nitrogen 14.8 8.4 - 25.7 mg/dL    Creatinine 0.90 0.72 - 1.25 mg/dL    Calcium 9.1 8.8 - 10.0 mg/dL    Protein Total 6.4 5.8 - 7.6 gm/dL    Albumin 4.2 3.4 - 4.8 g/dL    Globulin 2.2 (L) 2.4 - 3.5 gm/dL    Albumin/Globulin Ratio 1.9 1.1 - 2.0 ratio    Bilirubin Total 0.5 <=1.5 mg/dL    ALP 64 40 - 150 unit/L    ALT 22 0 - 55 unit/L    AST 27 5 - 34 unit/L    eGFR >60 mL/min/1.73/m2    Anion Gap 9.0 mEq/L    BUN/Creatinine Ratio 16    CBC with Differential    Collection Time: 11/21/24  2:52 PM   Result Value Ref Range    WBC 5.72 4.50 - 11.50 x10(3)/mcL    RBC 4.57 (L) 4.70 - 6.10 x10(6)/mcL    Hgb 14.6 14.0 - 18.0 g/dL    Hct 43.0 42.0 - 52.0 %    MCV 94.1 (H) 80.0 - 94.0 fL    MCH 31.9 (H) 27.0 - 31.0 pg    MCHC 34.0 33.0 - 36.0 g/dL    RDW 12.9 11.5 - 17.0 %    Platelet 203 130 - 400 x10(3)/mcL    MPV 9.1 7.4 - 10.4 fL    Neut % 66.4 %    Lymph % 23.6 %    Mono % 8.6 %    Eos % 0.7 %    Basophil % 0.7 %    Lymph # 1.35 0.6 - 4.6 x10(3)/mcL    Neut # 3.80 2.1 - 9.2 x10(3)/mcL    Mono # 0.49 0.1 - 1.3 x10(3)/mcL    Eos # 0.04 0 - 0.9 x10(3)/mcL    Baso # 0.04 <=0.2 x10(3)/mcL    IG# 0.00 0 - 0.04 x10(3)/mcL    IG% 0.0 %         Assessment:   Primary CNS lymphoma - in clinical remission  Chronic neuropathic symptoms involving right leg - stable  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  No role for ongoing surveillance imaging since he is over 5 years from treatment completion.  He will maintain follow-up on an annual basis with repeat laboratory.      BRENNEN ZHOU MD    Other Physicians  Dr. Brandon Mclain ([facility])

## 2024-11-21 ENCOUNTER — LAB VISIT (OUTPATIENT)
Dept: LAB | Facility: HOSPITAL | Age: 59
End: 2024-11-21
Attending: INTERNAL MEDICINE
Payer: COMMERCIAL

## 2024-11-21 DIAGNOSIS — C83.390 PRIMARY CENTRAL NERVOUS SYSTEM LYMPHOMA: ICD-10-CM

## 2024-11-21 LAB
ALBUMIN SERPL-MCNC: 4.2 G/DL (ref 3.4–4.8)
ALBUMIN/GLOB SERPL: 1.9 RATIO (ref 1.1–2)
ALP SERPL-CCNC: 64 UNIT/L (ref 40–150)
ALT SERPL-CCNC: 22 UNIT/L (ref 0–55)
ANION GAP SERPL CALC-SCNC: 9 MEQ/L
AST SERPL-CCNC: 27 UNIT/L (ref 5–34)
BASOPHILS # BLD AUTO: 0.04 X10(3)/MCL
BASOPHILS NFR BLD AUTO: 0.7 %
BILIRUB SERPL-MCNC: 0.5 MG/DL
BUN SERPL-MCNC: 14.8 MG/DL (ref 8.4–25.7)
CALCIUM SERPL-MCNC: 9.1 MG/DL (ref 8.8–10)
CHLORIDE SERPL-SCNC: 109 MMOL/L (ref 98–107)
CO2 SERPL-SCNC: 22 MMOL/L (ref 23–31)
CREAT SERPL-MCNC: 0.9 MG/DL (ref 0.72–1.25)
CREAT/UREA NIT SERPL: 16
EOSINOPHIL # BLD AUTO: 0.04 X10(3)/MCL (ref 0–0.9)
EOSINOPHIL NFR BLD AUTO: 0.7 %
ERYTHROCYTE [DISTWIDTH] IN BLOOD BY AUTOMATED COUNT: 12.9 % (ref 11.5–17)
GFR SERPLBLD CREATININE-BSD FMLA CKD-EPI: >60 ML/MIN/1.73/M2
GLOBULIN SER-MCNC: 2.2 GM/DL (ref 2.4–3.5)
GLUCOSE SERPL-MCNC: 110 MG/DL (ref 82–115)
HCT VFR BLD AUTO: 43 % (ref 42–52)
HGB BLD-MCNC: 14.6 G/DL (ref 14–18)
IMM GRANULOCYTES # BLD AUTO: 0 X10(3)/MCL (ref 0–0.04)
IMM GRANULOCYTES NFR BLD AUTO: 0 %
LYMPHOCYTES # BLD AUTO: 1.35 X10(3)/MCL (ref 0.6–4.6)
LYMPHOCYTES NFR BLD AUTO: 23.6 %
MCH RBC QN AUTO: 31.9 PG (ref 27–31)
MCHC RBC AUTO-ENTMCNC: 34 G/DL (ref 33–36)
MCV RBC AUTO: 94.1 FL (ref 80–94)
MONOCYTES # BLD AUTO: 0.49 X10(3)/MCL (ref 0.1–1.3)
MONOCYTES NFR BLD AUTO: 8.6 %
NEUTROPHILS # BLD AUTO: 3.8 X10(3)/MCL (ref 2.1–9.2)
NEUTROPHILS NFR BLD AUTO: 66.4 %
PLATELET # BLD AUTO: 203 X10(3)/MCL (ref 130–400)
PMV BLD AUTO: 9.1 FL (ref 7.4–10.4)
POTASSIUM SERPL-SCNC: 3.9 MMOL/L (ref 3.5–5.1)
PROT SERPL-MCNC: 6.4 GM/DL (ref 5.8–7.6)
RBC # BLD AUTO: 4.57 X10(6)/MCL (ref 4.7–6.1)
SODIUM SERPL-SCNC: 140 MMOL/L (ref 136–145)
WBC # BLD AUTO: 5.72 X10(3)/MCL (ref 4.5–11.5)

## 2024-11-21 PROCEDURE — 85025 COMPLETE CBC W/AUTO DIFF WBC: CPT

## 2024-11-21 PROCEDURE — 80053 COMPREHEN METABOLIC PANEL: CPT

## 2024-11-21 PROCEDURE — 36415 COLL VENOUS BLD VENIPUNCTURE: CPT

## 2024-11-25 ENCOUNTER — OFFICE VISIT (OUTPATIENT)
Dept: HEMATOLOGY/ONCOLOGY | Facility: CLINIC | Age: 59
End: 2024-11-25
Payer: COMMERCIAL

## 2024-11-25 VITALS
BODY MASS INDEX: 34.94 KG/M2 | HEART RATE: 97 BPM | RESPIRATION RATE: 18 BRPM | WEIGHT: 222.63 LBS | OXYGEN SATURATION: 96 % | SYSTOLIC BLOOD PRESSURE: 115 MMHG | HEIGHT: 67 IN | TEMPERATURE: 99 F | DIASTOLIC BLOOD PRESSURE: 76 MMHG

## 2024-11-25 DIAGNOSIS — C83.390 PRIMARY CENTRAL NERVOUS SYSTEM LYMPHOMA: Primary | ICD-10-CM

## 2024-11-25 PROCEDURE — 1159F MED LIST DOCD IN RCRD: CPT | Mod: CPTII,S$GLB,, | Performed by: INTERNAL MEDICINE

## 2024-11-25 PROCEDURE — 3078F DIAST BP <80 MM HG: CPT | Mod: CPTII,S$GLB,, | Performed by: INTERNAL MEDICINE

## 2024-11-25 PROCEDURE — 3074F SYST BP LT 130 MM HG: CPT | Mod: CPTII,S$GLB,, | Performed by: INTERNAL MEDICINE

## 2024-11-25 PROCEDURE — 99214 OFFICE O/P EST MOD 30 MIN: CPT | Mod: S$GLB,,, | Performed by: INTERNAL MEDICINE

## 2024-11-25 PROCEDURE — 99999 PR PBB SHADOW E&M-EST. PATIENT-LVL IV: CPT | Mod: PBBFAC,,, | Performed by: INTERNAL MEDICINE

## 2024-11-25 PROCEDURE — 3008F BODY MASS INDEX DOCD: CPT | Mod: CPTII,S$GLB,, | Performed by: INTERNAL MEDICINE

## 2024-11-25 PROCEDURE — 1160F RVW MEDS BY RX/DR IN RCRD: CPT | Mod: CPTII,S$GLB,, | Performed by: INTERNAL MEDICINE

## 2025-02-01 DIAGNOSIS — C80.1 NEUROPATHY ASSOCIATED WITH CANCER: ICD-10-CM

## 2025-02-01 DIAGNOSIS — G63 NEUROPATHY ASSOCIATED WITH CANCER: ICD-10-CM

## 2025-02-03 RX ORDER — GABAPENTIN 300 MG/1
CAPSULE ORAL
Qty: 270 CAPSULE | Refills: 1 | Status: SHIPPED | OUTPATIENT
Start: 2025-02-03

## 2025-07-02 DIAGNOSIS — G63 NEUROPATHY ASSOCIATED WITH CANCER: ICD-10-CM

## 2025-07-02 DIAGNOSIS — C80.1 NEUROPATHY ASSOCIATED WITH CANCER: ICD-10-CM

## 2025-07-02 DIAGNOSIS — C83.390 PRIMARY CENTRAL NERVOUS SYSTEM LYMPHOMA: ICD-10-CM

## 2025-07-02 RX ORDER — GABAPENTIN 300 MG/1
CAPSULE ORAL
Qty: 270 CAPSULE | Refills: 1 | Status: SHIPPED | OUTPATIENT
Start: 2025-07-02